# Patient Record
Sex: FEMALE | Race: WHITE | NOT HISPANIC OR LATINO | Employment: FULL TIME | URBAN - METROPOLITAN AREA
[De-identification: names, ages, dates, MRNs, and addresses within clinical notes are randomized per-mention and may not be internally consistent; named-entity substitution may affect disease eponyms.]

---

## 2018-01-19 ENCOUNTER — OFFICE VISIT CONVERTED (OUTPATIENT)
Dept: OTOLARYNGOLOGY | Facility: CLINIC | Age: 53
End: 2018-01-19
Attending: OTOLARYNGOLOGY

## 2018-12-28 ENCOUNTER — CONVERSION ENCOUNTER (OUTPATIENT)
Dept: OTOLARYNGOLOGY | Facility: CLINIC | Age: 53
End: 2018-12-28

## 2018-12-28 ENCOUNTER — OFFICE VISIT CONVERTED (OUTPATIENT)
Dept: OTOLARYNGOLOGY | Facility: CLINIC | Age: 53
End: 2018-12-28
Attending: OTOLARYNGOLOGY

## 2019-01-17 ENCOUNTER — HOSPITAL ENCOUNTER (OUTPATIENT)
Dept: ULTRASOUND IMAGING | Facility: HOSPITAL | Age: 54
Discharge: HOME | End: 2019-01-17
Attending: OTOLARYNGOLOGY

## 2019-02-08 ENCOUNTER — HOSPITAL ENCOUNTER (OUTPATIENT)
Dept: ULTRASOUND IMAGING | Facility: HOSPITAL | Age: 54
Discharge: HOME | End: 2019-02-08
Attending: OTOLARYNGOLOGY

## 2019-02-18 ENCOUNTER — OFFICE VISIT CONVERTED (OUTPATIENT)
Dept: OTOLARYNGOLOGY | Facility: CLINIC | Age: 54
End: 2019-02-18
Attending: OTOLARYNGOLOGY

## 2020-06-01 ENCOUNTER — HOSPITAL ENCOUNTER (OUTPATIENT)
Dept: GENERAL RADIOLOGY | Facility: HOSPITAL | Age: 55
Discharge: HOME | End: 2020-06-01
Attending: OTOLARYNGOLOGY

## 2020-06-17 ENCOUNTER — OFFICE VISIT CONVERTED (OUTPATIENT)
Dept: OTOLARYNGOLOGY | Facility: CLINIC | Age: 55
End: 2020-06-17
Attending: OTOLARYNGOLOGY

## 2021-05-13 NOTE — PROGRESS NOTES
"   Progress Note      Patient Name: Malik Rodriguez   Patient ID: [id number]   Sex: Female   YOB: 1965    Primary Care Provider: Yohannes HUSSEIN   Referring Provider: Yohannes HUSSEIN    Visit Date: June 17, 2020    Provider: Claudio Castro MD   Location: [facility]   Location Address: [address] [place] [address], [address] [place] [address]  [id number]   Location Phone: [phone]          Chief Complaint     \"I am doing okay.\"       History Of Present Illness     Malik Rodriguez is a 54 year old /White female who returns today for evaluation of thyroid nodules, globus sensation, and dysphasia.  She was originally seen on 1/19/18 where thyroid nodules had recently been noted upon workup for tightness in her throat.  She was also experiencing occasional hoarseness postnasal drainage.  Ultrasound of her thyroid at [facility] on 10/27/17 which revealed the right lobe to measure 4.4 x 1.8 x 1.4 cm without nodule. The left lobe measured 4.5 x 1.6 x 1.4 cm with 2 separate nodules. The first is a 1.2 cm isoechoic solid nodule and the second is a 1.4 cm hypoechoic solid nodule. The radiologist's report states that these are consistent with adenomas. She denied any family history of thyroid cancer or personal history of radiation exposure.  On examination that day she deferred laryngoscopic evaluation and elected to treat her symptoms empirically.  She was placed on a combination of Astepro and omeprazole. Repeat thyroid ultrasound on 12/19/18 revealed a left mid 1.7 x 1.5 cm hypoechoic nodule, left upper 1 x 1.3 cm hypoechoic nodule, lower 1.2 x 0.6 cm hypoechoic nodule.  She was most recently seen on 12/28/18 at which time was decided that she should undergo an ultrasound-guided FNA of the left 1.7 cm hypoechoic nodule. Ultrasound-guided FNA on 1/17/19 which was nondiagnostic ([place] category I) due to inadequate cellularity.  Repeat ultrasound-guided FNA on " 2/8/19 revealed a benign follicular nodule with reparative change ([place] category II).      She returns today for follow-up.  She tells me that she has been doing well since our last visit but does report nasal congestion and facial pain which is improved with Sudafed.  Continues to take the omeprazole twice daily for reflux and is almost out of her medication. She denies any problems with neck pain, hoarseness, dysphagia, fever, chills, night sweats, or unintentional weight loss.  Repeat thyroid ultrasound on 6/1/2020 reveals stable left 1.3 cm mixed nodule, stable left 1.6 cm nodule, and a stable 1 cm nodule.              Past Medical History  Allergic rhinitis; Anemia; Cough; Dizziness; Dysphagia; Dysphagia; Ear Pain; Globus sensation; LPRD (laryngopharyngeal reflux disease); Strep throat; Thyroid nodule         Past Surgical History  Gallbladder; Hysterectomy         Medication List  alendronate 35 mg oral tablet; azelastine 137 mcg (0.1 %) nasal aerosol,spray; Glucosamine Chondroitin PLUS 875-462-51-54 mg oral capsule; Minivelle 0.0375 mg/24 hr transdermal patch semiweekly; multivitamin oral capsule; omeprazole 40 mg oral capsule,delayed release(DR/EC); Zyrtec 10 mg oral capsule         Allergy List  Codiene; hydrocodone bitartrate; Phenergan         Family Medical History  Stroke; Heart Disease; Cancer, Unspecified; Diabetes         Social History  Tobacco (Never)         Review of Systems  · Constitutional  o Denies  o : fever, night sweats, weight loss  · Eyes  o Denies  o : discharge from eye, impaired vision  · HENT  o Admits  o : *See HPI  · Cardiovascular  o Denies  o : chest pain, irregular heart beats  · Respiratory  o Denies  o : shortness of breath, wheezing, coughing up blood  · Gastrointestinal  o Denies  o : heartburn, reflux, vomiting blood  · Genitourinary  o Denies  o : frequency  · Integument  o Denies  o : rash, skin dryness  · Neurologic  o Denies  o : seizures, loss of balance, loss of  "consciousness, dizziness  · Endocrine  o Denies  o : cold intolerance, heat intolerance  · Heme-Lymph  o Denies  o : easy bleeding, anemia      Vitals  Date Time BP Position Site L\R Cuff Size HR RR TEMP (F) WT  HT  BMI kg/m2 BSA m2 O2 Sat HC       06/17/2020 02:27 PM        98.8 184lbs 16oz 5'  3\" 32.77 1.93           Physical Examination  · Constitutional  o Appearance  o : well developed, well-nourished, alert and in no acute distress, voice clear and strong  · Head and Face  o Head  o :   § Inspection  § : no deformities or lesions  o Face  o :   § Inspection  § : No facial lesions; House-Brackmann I/VI bilaterally  § Palpation  § : No TMJ crepitus nor  muscle tenderness bilaterally  · Eyes  o Vision  o :   § Visual Fields  § : Extraocular movements are intact. No spontaneous or gaze-induced nystagmus.  o Conjunctivae  o : clear  o Sclerae  o : clear  o Pupils and Irises  o : pupils equal, round, and reactive to light.   · Ears, Nose, Mouth and Throat  o Ears  o :   § External Ears  § : appearance within normal limits, no lesions present  § Otoscopic Examination  § : tympanic membrane appearance within normal limits bilaterally without perforations, well-aerated middle ears  § Hearing  § : intact to conversational voice both ears  o Nose  o :   § External Nose  § : appearance normal  § Intranasal Exam  § : mucosa within normal limits, vestibules normal, no intranasal lesions present, septum midline, sinuses non tender to percussion  o Oral Cavity  o :   § Oral Mucosa  § : oral mucosa normal without pallor or cyanosis  § Lips  § : lip appearance normal  § Teeth  § : normal dentition for age  § Gums  § : gums pink, non-swollen, no bleeding present  § Tongue  § : tongue appearance normal; normal mobility  § Palate  § : hard palate normal, soft palate appearance normal with symmetric mobility  o Throat  o :   § Oropharynx  § : no inflammation or lesions present, tonsils within normal " limits  · Neck  o Inspection/Palpation  o : normal appearance, no masses or tenderness, trachea midline; thyroid size normal, nontender, no nodules or masses present on palpation  · Respiratory  o Respiratory Effort  o : breathing unlabored  · Lymphatic  o Neck  o : no lymphadenopathy present  o Supraclavicular Nodes  o : no lymphadenopathy present  o Preauricular Nodes  o : no lymphadenopathy present  · Skin and Subcutaneous Tissue  o General Inspection  o : Regarding face and neck - there are no rashes present, no lesions present, and no areas of discoloration  · Neurologic  o Cranial Nerves  o : cranial nerves II-XII are grossly intact bilaterally  o Gait and Station  o : normal gait, able to stand without diffculty  · Psychiatric  o Judgement and Insight  o : judgment and insight intact  o Mood and Affect  o : mood normal, affect appropriate          Results     Patient: PATRICK FUNEZ  : 1965 Acct#: [id number] MRN: [id number]  Location:[facility] Tech: [facility]  Ordered By: ASHELY QUINONES MD DOS: 2020  Cc Report : Exam Time: 14:04  PROCEDURE: US SOFT TISSUES OF THE HEAD AND NECK  COMPARISON: [facility], US, US SOFT TISSUES OF THE HEAD AND NECK,  2018, 15:01.  INDICATIONS: NODULES  TECHNIQUE: High-resolution ultrasound examination of the thyroid gland was performed.  FINDINGS:  Right lobe measures 1.3 x 1.8 x 4.1 cm. No nodules. Isthmus measures 2 mm. Left lobe measures 1.4 x 2.1 by  4.7 cm. 1.3 cm solid mixed echotexture nodule in the upper to mid left lobe is stable to slightly smaller. Similar  appearing nodule in the mid left lobe measures 1.6 cm and is also not significantly changed. A 3rd similar  appearing nodule in the lower pole the left lobe measures 10 mm and is stable.  CONCLUSION: 3 TR 3 nodules in the left lobe are similar to the previous study. Two of these nodules  have been sampled in the past. Correlate with pathology results. The largest  nodule measures up to 1.6  cm. Recommend attention on a 1 year follow-up thyroid ultrasound.  DANYELL CORONEL MD  Electronically Signed and Approved By: DANYELL CORONEL MD on 6/01/2020 at 14:14       Assessment  · Thyroid nodule     241.0/E04.1  · Chronic rhinitis     472.0/J31.0    Problems Reconciled  Plan  · Orders  o Ultrasound Thyroid. (39708) - 241.0/E04.1 - 06/17/2022  · Medications  o pseudoephedrine HCl 60 mg oral tablet   SIG: take 1 tablet (60 mg) by oral route every 6 hours as needed for 30 days   DISP: (60) tablets with 4 refills  Prescribed on 06/17/2020     o omeprazole 40 mg oral capsule,delayed release(DR/EC)   SIG: take 1 capsule by oral route daily for 30 days   DISP: (30) Capsule with 3 refills  Adjusted on 06/17/2020     o Medications have been Reconciled  o Transition of Care or Provider Policy  · Instructions  o Impressions and plans were discussed Mrs. Rodriguez at great length. We reviewed the results of her 6/1/2020 thyroid ultrasound which revealed stable 1.3, 1.6, and 1 cm left-sided nodules. She is otherwise doing well aside from nasal congestion and facial pain for which she finds Sudafed helpful. She will be continued on Sudafed and will also reduce her dose of omeprazole which has been helpful for her intermittent hoarseness and throat tightness. We discussed the possibility of further work-up of her facial pain with a CT but she politely declined. She will follow-up in 2 years with a repeat thyroid ultrasound or sooner if needed.            Electronically Signed by: Claudio Castro MD -Author on June 17, 2020 02:51:30 PM

## 2021-05-15 VITALS
RESPIRATION RATE: 16 BRPM | TEMPERATURE: 98.7 F | HEIGHT: 63 IN | WEIGHT: 181 LBS | SYSTOLIC BLOOD PRESSURE: 131 MMHG | HEART RATE: 84 BPM | OXYGEN SATURATION: 96 % | BODY MASS INDEX: 32.07 KG/M2 | DIASTOLIC BLOOD PRESSURE: 55 MMHG

## 2021-05-15 VITALS — WEIGHT: 185 LBS | BODY MASS INDEX: 32.78 KG/M2 | HEIGHT: 63 IN | TEMPERATURE: 98.8 F

## 2021-05-15 VITALS
DIASTOLIC BLOOD PRESSURE: 62 MMHG | WEIGHT: 181.25 LBS | RESPIRATION RATE: 16 BRPM | TEMPERATURE: 98.5 F | OXYGEN SATURATION: 98 % | BODY MASS INDEX: 32.11 KG/M2 | HEART RATE: 91 BPM | SYSTOLIC BLOOD PRESSURE: 134 MMHG | HEIGHT: 63 IN

## 2021-05-16 VITALS
WEIGHT: 170 LBS | SYSTOLIC BLOOD PRESSURE: 138 MMHG | OXYGEN SATURATION: 97 % | DIASTOLIC BLOOD PRESSURE: 59 MMHG | TEMPERATURE: 98.8 F | HEIGHT: 63 IN | HEART RATE: 88 BPM | BODY MASS INDEX: 30.12 KG/M2

## 2021-07-05 DIAGNOSIS — E04.1 THYROID NODULE: Primary | ICD-10-CM

## 2021-10-27 DIAGNOSIS — K21.9 GASTROESOPHAGEAL REFLUX DISEASE, UNSPECIFIED WHETHER ESOPHAGITIS PRESENT: Primary | ICD-10-CM

## 2021-10-28 RX ORDER — OMEPRAZOLE 40 MG/1
CAPSULE, DELAYED RELEASE ORAL
Qty: 60 CAPSULE | Refills: 2 | Status: SHIPPED | OUTPATIENT
Start: 2021-10-28 | End: 2022-05-16

## 2022-05-15 DIAGNOSIS — K21.9 GASTROESOPHAGEAL REFLUX DISEASE, UNSPECIFIED WHETHER ESOPHAGITIS PRESENT: ICD-10-CM

## 2022-05-16 RX ORDER — OMEPRAZOLE 40 MG/1
CAPSULE, DELAYED RELEASE ORAL
Qty: 60 CAPSULE | Refills: 2 | Status: SHIPPED | OUTPATIENT
Start: 2022-05-16 | End: 2022-08-11

## 2022-06-02 ENCOUNTER — APPOINTMENT (OUTPATIENT)
Dept: ULTRASOUND IMAGING | Facility: HOSPITAL | Age: 57
End: 2022-06-02

## 2022-06-20 ENCOUNTER — HOSPITAL ENCOUNTER (OUTPATIENT)
Dept: ULTRASOUND IMAGING | Facility: HOSPITAL | Age: 57
Discharge: HOME | End: 2022-06-20
Admitting: OTOLARYNGOLOGY

## 2022-06-20 DIAGNOSIS — E04.1 THYROID NODULE: ICD-10-CM

## 2022-06-20 PROCEDURE — 76536 US EXAM OF HEAD AND NECK: CPT

## 2022-07-15 ENCOUNTER — OFFICE VISIT (OUTPATIENT)
Dept: OTOLARYNGOLOGY | Facility: CLINIC | Age: 57
End: 2022-07-15

## 2022-07-15 VITALS — BODY MASS INDEX: 33.06 KG/M2 | HEIGHT: 63 IN | TEMPERATURE: 97.1 F | WEIGHT: 186.6 LBS

## 2022-07-15 DIAGNOSIS — J32.9 CHRONIC RHINOSINUSITIS: ICD-10-CM

## 2022-07-15 DIAGNOSIS — E04.1 THYROID NODULE: Primary | ICD-10-CM

## 2022-07-15 DIAGNOSIS — J31.0 CHRONIC RHINOSINUSITIS: ICD-10-CM

## 2022-07-15 PROCEDURE — 99214 OFFICE O/P EST MOD 30 MIN: CPT | Performed by: OTOLARYNGOLOGY

## 2022-07-15 RX ORDER — CETIRIZINE HYDROCHLORIDE 10 MG/1
CAPSULE, LIQUID FILLED ORAL
COMMUNITY

## 2022-07-15 RX ORDER — AMOXICILLIN AND CLAVULANATE POTASSIUM 875; 125 MG/1; MG/1
1 TABLET, FILM COATED ORAL EVERY 12 HOURS
Qty: 28 TABLET | Refills: 0 | Status: SHIPPED | OUTPATIENT
Start: 2022-07-15 | End: 2022-07-29

## 2022-07-15 RX ORDER — OXYBUTYNIN CHLORIDE 5 MG/1
TABLET ORAL
COMMUNITY
Start: 2022-07-05

## 2022-07-15 RX ORDER — ESTRADIOL 0.04 MG/D
FILM, EXTENDED RELEASE TRANSDERMAL
COMMUNITY
Start: 2022-07-02

## 2022-07-15 RX ORDER — ALENDRONATE SODIUM 35 MG/1
35 TABLET ORAL WEEKLY
COMMUNITY
Start: 2022-05-23

## 2022-07-15 RX ORDER — FEXOFENADINE HCL AND PSEUDOEPHEDRINE HCI 180; 240 MG/1; MG/1
1 TABLET, EXTENDED RELEASE ORAL DAILY PRN
Qty: 90 TABLET | Refills: 3 | Status: SHIPPED | OUTPATIENT
Start: 2022-07-15

## 2022-07-15 RX ORDER — PREDNISONE 10 MG/1
TABLET ORAL
Qty: 30 TABLET | Refills: 0 | Status: SHIPPED | OUTPATIENT
Start: 2022-07-15

## 2022-07-15 RX ORDER — PHENOL 1.4 %
600 AEROSOL, SPRAY (ML) MUCOUS MEMBRANE DAILY
COMMUNITY

## 2022-08-10 DIAGNOSIS — K21.9 GASTROESOPHAGEAL REFLUX DISEASE, UNSPECIFIED WHETHER ESOPHAGITIS PRESENT: ICD-10-CM

## 2022-08-11 RX ORDER — OMEPRAZOLE 40 MG/1
CAPSULE, DELAYED RELEASE ORAL
Qty: 60 CAPSULE | Refills: 2 | Status: SHIPPED | OUTPATIENT
Start: 2022-08-11 | End: 2022-11-23

## 2022-11-22 DIAGNOSIS — K21.9 GASTROESOPHAGEAL REFLUX DISEASE, UNSPECIFIED WHETHER ESOPHAGITIS PRESENT: ICD-10-CM

## 2022-11-23 DIAGNOSIS — K21.9 GASTROESOPHAGEAL REFLUX DISEASE, UNSPECIFIED WHETHER ESOPHAGITIS PRESENT: ICD-10-CM

## 2022-11-23 RX ORDER — OMEPRAZOLE 40 MG/1
CAPSULE, DELAYED RELEASE ORAL
Qty: 60 CAPSULE | Refills: 2 | Status: SHIPPED | OUTPATIENT
Start: 2022-11-23 | End: 2022-11-30

## 2022-11-30 RX ORDER — OMEPRAZOLE 40 MG/1
CAPSULE, DELAYED RELEASE ORAL
Qty: 60 CAPSULE | Refills: 2 | Status: SHIPPED | OUTPATIENT
Start: 2022-11-30

## 2023-07-24 ENCOUNTER — OFFICE VISIT (OUTPATIENT)
Dept: OTOLARYNGOLOGY | Facility: CLINIC | Age: 58
End: 2023-07-24
Payer: COMMERCIAL

## 2023-07-24 VITALS — DIASTOLIC BLOOD PRESSURE: 79 MMHG | HEART RATE: 80 BPM | SYSTOLIC BLOOD PRESSURE: 138 MMHG | TEMPERATURE: 98 F

## 2023-07-24 DIAGNOSIS — E04.1 THYROID NODULE: Primary | ICD-10-CM

## 2023-07-24 PROCEDURE — 99213 OFFICE O/P EST LOW 20 MIN: CPT | Performed by: OTOLARYNGOLOGY

## 2023-07-24 RX ORDER — ESTRADIOL 10 UG/1
INSERT VAGINAL
COMMUNITY
Start: 2023-04-11

## 2023-07-24 RX ORDER — PHENAZOPYRIDINE HYDROCHLORIDE 100 MG/1
1 TABLET, FILM COATED ORAL 3 TIMES DAILY
COMMUNITY
Start: 2023-07-10

## 2023-07-24 RX ORDER — NITROFURANTOIN 25; 75 MG/1; MG/1
1 CAPSULE ORAL EVERY 12 HOURS SCHEDULED
COMMUNITY
Start: 2023-07-16

## 2023-07-24 RX ORDER — LORAZEPAM 1 MG/1
1 TABLET ORAL
COMMUNITY
Start: 2023-03-17

## 2023-07-24 RX ORDER — BUPROPION HYDROCHLORIDE 150 MG/1
TABLET ORAL
COMMUNITY
Start: 2023-04-26

## 2023-07-24 RX ORDER — AMOXICILLIN AND CLAVULANATE POTASSIUM 500; 125 MG/1; MG/1
1 TABLET, FILM COATED ORAL EVERY 12 HOURS SCHEDULED
COMMUNITY
Start: 2023-07-10

## 2023-07-24 RX ORDER — CALCIUM CARBONATE/VITAMIN D3 600 MG-10
1 TABLET ORAL DAILY
COMMUNITY

## 2023-07-24 NOTE — PROGRESS NOTES
Patient Name: Malik Rodriguez   Visit Date: 07/24/2023   Patient ID: [id number]  Provider: Claudio Castro MD    Sex: female  [facility]   YOB: 1965  Location Address: [address] [place] [address], [address],?[place]?[address]    Primary Care Provider Anahy Esparza APRN  Location Phone: [phone]    Referring Provider: No ref. provider found        Chief Complaint  1 year follow up w/ US    History of Present Illness  Malik Rodriguez is a 57 y.o. female who returns today for evaluation of thyroid nodules, globus sensation, and dysphasia.  She was originally seen on 1/19/18 where thyroid nodules had recently been noted upon workup for tightness in her throat.  She was also experiencing occasional hoarseness postnasal drainage.  Ultrasound of her thyroid at [facility] on 10/27/17 which revealed the right lobe to measure 4.4 x 1.8 x 1.4 cm without nodule. The left lobe measured 4.5 x 1.6 x 1.4 cm with 2 separate nodules. The first is a 1.2 cm isoechoic solid nodule and the second is a 1.4 cm hypoechoic solid nodule. The radiologist's report states that these are consistent with adenomas. She denied any family history of thyroid cancer or personal history of radiation exposure.  On examination that day she deferred laryngoscopic evaluation and elected to treat her symptoms empirically.  She was placed on a combination of Astepro and omeprazole. Repeat thyroid ultrasound on 12/19/18 revealed a left mid 1.7 x 1.5 cm hypoechoic nodule, left upper 1 x 1.3 cm hypoechoic nodule, lower 1.2 x 0.6 cm hypoechoic nodule.  She was most recently seen on 12/28/18 at which time was decided that she should undergo an ultrasound-guided FNA of the left 1.7 cm hypoechoic nodule. Ultrasound-guided FNA on 1/17/19 which was nondiagnostic ([place] category I) due to inadequate cellularity.  Repeat ultrasound-guided FNA on 2/8/19 revealed a benign follicular nodule  with reparative change ([place] category II). Repeat thyroid ultrasound on 6/1/2020 reveals stable left 1.3 cm mixed nodule, stable left 1.6 cm nodule, and a stable 1 cm nodule. Thyroid ultrasound on 6/20/22 demonstrated a left mid 1.8 cm hypoechoic nodule previously measuring 2 cm in 2018.  There was also a left 1.3 cm slightly hypoechoic nodule and a left 1 cm hypoechoic nodule both of which were stable.     She returns today for follow-up having last been seen on 7/15/2022.  At that time her nodules were stable but she mentioned brown to clear rhinorrhea, left nasal congestion, and facial pressure she was last seen on 6/17/2020. Thyroid ultrasound on 6/20/22 demonstrated a left mid 1.8 cm hypoechoic nodule previously measuring 2 cm in 2018.  There was also a left 1.3 cm slightly hypoechoic nodule and a left 1 cm hypoechoic nodule both of which were stable.  She also mentioned stable brown to clear rhinorrhea and right greater than left nasal congestion and face pressure.  She had tried nasal sprays in the past but they would often cause epistaxis.  She had completed immunotherapy x 2.  She was placed on a course of Augmentin and prednisone.  She tells me that she is doing well.  She denies any compressive symptoms.  Thyroid ultrasound on 7/10/2023 revealed a left superior 1.3 cm hypoechoic nodule, a left mid 1.9 cm hypoechoic nodule, a left inferior 1.3 cm hypoechoic nodule with calcifications which previously measured 1 cm.        Past Medical History:   Diagnosis Date    GERD (gastroesophageal reflux disease)     Sinusitis        Past Surgical History:   Procedure Laterality Date    APPENDECTOMY  2000    GALLBLADDER SURGERY  2006    HYSTERECTOMY  2000    KNEE SURGERY Right 2008    KNEE SURGERY Left 2021    TOE SURGERY  2019    US GUIDED FINE NEEDLE ASPIRATION  01/17/2019    US GUIDED FINE NEEDLE ASPIRATION  02/08/2019         Current Outpatient Medications:     alendronate (FOSAMAX) 35 MG tablet, Take 1 tablet  by mouth 1 (One) Time Per Week., Disp: , Rfl:     amoxicillin-clavulanate (AUGMENTIN) 500-125 MG per tablet, Take 1 tablet by mouth Every 12 (Twelve) Hours., Disp: , Rfl:     buPROPion XL (WELLBUTRIN XL) 150 MG 24 hr tablet, , Disp: , Rfl:     calcium carbonate (OS-THADDEUS) 600 MG tablet, Take 1 tablet by mouth Daily., Disp: , Rfl:     Cetirizine HCl (ZyrTEC Allergy) 10 MG capsule, Zyrtec 10 mg oral capsule take 1 capsule by oral route daily   Active, Disp: , Rfl:     estradiol (VIVELLE-DOT) 0.0375 MG/24HR patch, APPLY 1 PATCH FOR 3 DAYS ALTERNATING WITH 1 PATCH FOR 4 DAYS EACH WEEK FOR 3 WEEKS PER 4 WEEK CYCLE, Disp: , Rfl:     fexofenadine-pseudoephedrine (Allegra-D Allergy & Congestion)  MG per 12 hr tablet, TAKE 1 TABLET BY MOUTH EVERY DAY, Disp: 90 tablet, Rfl: 3    LORazepam (ATIVAN) 1 MG tablet, Take 1 tablet by mouth., Disp: , Rfl:     nitrofurantoin, macrocrystal-monohydrate, (MACROBID) 100 MG capsule, Take 1 capsule by mouth Every 12 (Twelve) Hours., Disp: , Rfl:     omeprazole (priLOSEC) 40 MG capsule, TAKE 1 CAPSULE BY MOUTH TWICE DAILY BEFORE A MEAL, Disp: 60 capsule, Rfl: 2    phenazopyridine (PYRIDIUM) 100 MG tablet, Take 1 tablet by mouth 3 (Three) Times a Day., Disp: , Rfl:     calcium carb-cholecalciferol 600-10 MG-MCG tablet per tablet, Take 1 tablet by mouth Daily., Disp: , Rfl:     Imvexxy Maintenance Pack 10 MCG insert, PLACE 10 MCG VAGINALY TWICE A WEEK (Patient not taking: Reported on 7/24/2023), Disp: , Rfl:     oxybutynin (DITROPAN) 5 MG tablet, TAKE 1 TABLET BY MOUTH 2 TO 3 TIMES DAILY AS NEEDED FOR BLADDER SPASMS, Disp: , Rfl:      Allergies   Allergen Reactions    Codeine Hives    Hydrocodone Hives and Nausea And Vomiting    Phenergan [Promethazine Hcl] Hives       Social History     Tobacco Use    Smoking status: Never    Smokeless tobacco: Never        Objective     Vital Signs:   /79   Pulse 80   Temp 98 °F (36.7 °C)       Physical Exam    General: Well developed, well  nourished patient of stated age in no acute distress. Voice is strong and clear.   Head: Normocephalic and atraumatic.  Face: No lesions.  Bilateral parotid and submandibular glands are unremarkable.  Stensen's and Warthin's ducts are productive of clear saliva bilaterally.  House-Brackmann I/VI     bilaterally.   muscles and temporomandibular joint nontender to palpation.  TMJ crepitus.  Eyes: PERRLA, sclerae anicteric, no conjunctival injection. Extraocular movements are intact and full. No nystagmus.   Ears: Auricles are normal in appearance. Bilateral external auditory canals are unremarkable. Bilateral tympanic membranes are clear and without effusion. Hearing normal to conversational voice.   Nose: External nose is normal in appearance. Bilateral nares are patent with normal appearing mucosa. Septum midline. Turbinates are unremarkable. No lesions.   Oral Cavity: Lips are normal in appearance. Oral mucosa is unremarkable. Gingiva is unremarkable. Normal dentition for age. Tongue is unremarkable with good movement. Hard palate is unremarkable.   Oropharynx: Soft palate is unremarkable with full movement. Uvula is unremarkable. Bilateral tonsils are unremarkable. Posterior oropharynx is unremarkable.    Larynx and hypopharynx: Deferred secondary to gag reflex.  Neck: Supple.  No mass.  Nontender to palpation.  Trachea midline. Thyroid normal size and without nodules to palpation.   Lymphatic: No lymphadenopathy upon palpation.   Psychiatric: Appropriate affect, cooperative   Neurologic: Oriented x 3, strength symmetric in all extremities, Cranial Nerves II-XII are grossly intact to confrontation   Skin: Warm and dry. No rashes.    Procedures           Result Review :               Assessment and Plan    Diagnoses and all orders for this visit:    1. Thyroid nodule (Primary)  -     US Guided Thyroid Biopsy; Future  -     TSH+Free T4; Future    Other orders  -     Non-gynecologic Cytology;  Standing    Impressions and findings were discussed at great length.  Currently, we reviewed and discussed the images from her 7/10/2023 thyroid ultrasound which revealed a left superior 1.3 cm hypoechoic nodule, a left mid 1.9 cm hypoechoic nodule, a left inferior 1.3 cm hypoechoic nodule which is slightly taller than wide with calcifications which previously measured 1 cm.  We discussed the concerning changes to the left inferior nodule.  Options for further evaluation and management were discussed and I have recommended an ultrasound-guided FNA. She was given ample time to ask questions, all of which were answered to her satisfaction.        Follow Up   Return in about 1 year (around 7/24/2024).  Patient was given instructions and counseling regarding her condition or for health maintenance advice. Please see specific information pulled into the AVS if appropriate.

## 2023-08-04 ENCOUNTER — TELEPHONE (OUTPATIENT)
Dept: OTOLARYNGOLOGY | Facility: CLINIC | Age: 58
End: 2023-08-04
Payer: COMMERCIAL

## 2023-09-22 ENCOUNTER — TELEPHONE (OUTPATIENT)
Dept: OTOLARYNGOLOGY | Facility: CLINIC | Age: 58
End: 2023-09-22

## 2023-09-22 NOTE — TELEPHONE ENCOUNTER
The [facility] received a fax that requires your attention. The document has been indexed to the patient’s chart for your review.      Reason for sending: RCVD AND INDEXED TSH RESULTS. REQUEST PROVIDER REVIEW.     Documents Description: TSH RESULTS_[place] [id number]    Name of Sender: [facility]    Date Indexed: 09/22/23    Notes (if needed):

## 2024-01-22 DIAGNOSIS — K21.9 GASTROESOPHAGEAL REFLUX DISEASE, UNSPECIFIED WHETHER ESOPHAGITIS PRESENT: ICD-10-CM

## 2024-01-22 RX ORDER — OMEPRAZOLE 40 MG/1
CAPSULE, DELAYED RELEASE ORAL
Qty: 60 CAPSULE | Refills: 2 | Status: SHIPPED | OUTPATIENT
Start: 2024-01-22

## 2024-04-13 DIAGNOSIS — K21.9 GASTROESOPHAGEAL REFLUX DISEASE, UNSPECIFIED WHETHER ESOPHAGITIS PRESENT: ICD-10-CM

## 2024-04-15 RX ORDER — OMEPRAZOLE 40 MG/1
CAPSULE, DELAYED RELEASE ORAL
Qty: 60 CAPSULE | Refills: 2 | Status: SHIPPED | OUTPATIENT
Start: 2024-04-15

## 2024-04-30 ENCOUNTER — TRANSCRIBE ORDERS (OUTPATIENT)
Dept: ADMINISTRATIVE | Facility: HOSPITAL | Age: 59
End: 2024-04-30
Payer: COMMERCIAL

## 2024-04-30 DIAGNOSIS — E04.2 MULTINODULAR THYROID: Primary | ICD-10-CM

## 2024-05-16 ENCOUNTER — HOSPITAL ENCOUNTER (OUTPATIENT)
Dept: ULTRASOUND IMAGING | Facility: HOSPITAL | Age: 59
Discharge: HOME | End: 2024-05-16
Admitting: NURSE PRACTITIONER
Payer: COMMERCIAL

## 2024-05-16 DIAGNOSIS — E04.2 MULTINODULAR THYROID: ICD-10-CM

## 2024-05-16 PROCEDURE — 76536 US EXAM OF HEAD AND NECK: CPT

## 2024-07-28 DIAGNOSIS — K21.9 GASTROESOPHAGEAL REFLUX DISEASE, UNSPECIFIED WHETHER ESOPHAGITIS PRESENT: ICD-10-CM

## 2024-07-29 RX ORDER — OMEPRAZOLE 40 MG/1
CAPSULE, DELAYED RELEASE ORAL
Qty: 60 CAPSULE | Refills: 2 | Status: SHIPPED | OUTPATIENT
Start: 2024-07-29